# Patient Record
Sex: FEMALE | Race: BLACK OR AFRICAN AMERICAN | NOT HISPANIC OR LATINO | URBAN - METROPOLITAN AREA
[De-identification: names, ages, dates, MRNs, and addresses within clinical notes are randomized per-mention and may not be internally consistent; named-entity substitution may affect disease eponyms.]

---

## 2018-05-15 NOTE — PATIENT DISCUSSION
Artificial Tears: One drop to both eyes 3-4 times daily. We recommend Systane or Refresh lubricating eye drops which can be found at any pharmacy. Preservative free.

## 2020-08-08 NOTE — PATIENT DISCUSSION
+Lensar. I have personally seen and examined this patient.  I have fully participated in the care of this patient. I have reviewed all pertinent clinical information, including history, physical exam, plan and the Resident’s note and agree except as noted.

## 2021-02-25 ENCOUNTER — IMPORTED ENCOUNTER (OUTPATIENT)
Dept: URBAN - METROPOLITAN AREA CLINIC 9 | Facility: CLINIC | Age: 56
End: 2021-02-25

## 2021-02-25 PROBLEM — D44.3: Noted: 2021-02-25

## 2021-02-25 PROBLEM — H52.13: Noted: 2021-02-25

## 2021-02-25 PROBLEM — H25.13: Noted: 2021-02-25

## 2021-02-25 PROBLEM — H10.13: Noted: 2021-02-25

## 2021-02-25 PROBLEM — H50.34: Noted: 2021-02-25

## 2021-10-16 ASSESSMENT — TONOMETRY
OS_IOP_MMHG: 19
OD_IOP_MMHG: 18

## 2021-10-16 ASSESSMENT — VISUAL ACUITY
OD_CC: 20/20 SN
OS_CC: 20/20 SN
OS_CC: 20/20 - SN

## 2022-02-15 ENCOUNTER — ESTABLISHED PATIENT (OUTPATIENT)
Dept: URBAN - METROPOLITAN AREA CLINIC 14 | Facility: CLINIC | Age: 57
End: 2022-02-15

## 2022-02-15 DIAGNOSIS — D44.3: ICD-10-CM

## 2022-02-15 DIAGNOSIS — H25.13: ICD-10-CM

## 2022-02-15 DIAGNOSIS — H50.34: ICD-10-CM

## 2022-02-15 DIAGNOSIS — H52.13: ICD-10-CM

## 2022-02-15 DIAGNOSIS — H10.13: ICD-10-CM

## 2022-02-15 PROCEDURE — 99214 OFFICE O/P EST MOD 30 MIN: CPT

## 2022-02-15 PROCEDURE — 92015 DETERMINE REFRACTIVE STATE: CPT

## 2022-02-15 ASSESSMENT — TONOMETRY
OS_IOP_MMHG: 14
OD_IOP_MMHG: 13

## 2022-03-15 ENCOUNTER — TECH ONLY (OUTPATIENT)
Dept: URBAN - METROPOLITAN AREA CLINIC 14 | Facility: CLINIC | Age: 57
End: 2022-03-15

## 2022-03-15 DIAGNOSIS — H53.453: ICD-10-CM

## 2022-03-15 DIAGNOSIS — D44.3: ICD-10-CM

## 2022-03-15 PROCEDURE — 92083 EXTENDED VISUAL FIELD XM: CPT

## 2022-03-21 NOTE — PATIENT DISCUSSION
Discharge Planning:     Writer down to speak with patient about refusing therapy and rehab. Patient told writer she did not want to participate and she knows what is good for her. Writer provided education on importance of being up and working with therapy. Patient told writer she was not interested. Writer asked her if she would be agreeable to [facility] at discharge to assist with endurance and strengthening and she continued to refuse. Writer stated I can set it up and you can think about it, when you do discharge and they call, you can you make an informed decision at that point. Patient stated, \"oh I see, so you can cover yourself. \" Writer explained that is not the case, and if she did not want it, [facility] Junito will respect her wishes and end the conversation. Patient stated, \"thank you. \" Patient educated on [employer]fony blended vision. Patient understands that the near focal point will be at approximately 20 inches with the first eye. Near vision will be achieved with surgery on the second eye. Patient educated they may experience halos at night that are typically resolved without therapy. Patient educated there is a 1 in 500 chance there will be something about the vision that they do not like. Patient educated there is a 10% chance of needing enhancement after surgery. Patient elects Symfony OD, goal of emmetropia.

## 2022-07-04 RX ORDER — METOPROLOL SUCCINATE 50 MG/1
TABLET, EXTENDED RELEASE ORAL
COMMUNITY

## 2022-07-04 RX ORDER — FLUTICASONE PROPIONATE 50 MCG
SPRAY, SUSPENSION (ML) NASAL
COMMUNITY

## 2022-07-04 RX ORDER — AZELASTINE 1 MG/ML
SPRAY, METERED NASAL
COMMUNITY

## 2022-07-04 RX ORDER — OMEPRAZOLE 20 MG/1
CAPSULE, DELAYED RELEASE ORAL
COMMUNITY

## 2022-07-04 RX ORDER — TRIAMTERENE AND HYDROCHLOROTHIAZIDE 37.5; 25 MG/1; MG/1
TABLET ORAL
COMMUNITY

## 2022-07-15 PROBLEM — E23.7 ABNORMALITY OF PITUITARY GLAND (HCC): Status: ACTIVE | Noted: 2022-07-15

## 2022-07-15 PROBLEM — M75.42 SUBACROMIAL IMPINGEMENT OF LEFT SHOULDER: Status: ACTIVE | Noted: 2022-07-15

## 2022-07-15 PROBLEM — M62.838 TRAPEZIUS MUSCLE SPASM: Status: ACTIVE | Noted: 2022-07-15

## 2022-07-15 PROBLEM — M54.2 CERVICALGIA: Status: ACTIVE | Noted: 2022-07-15

## 2022-07-15 PROBLEM — M25.512 PAIN IN LEFT SHOULDER: Status: ACTIVE | Noted: 2022-07-15

## 2022-08-19 PROBLEM — E04.1 THYROID NODULE: Status: ACTIVE | Noted: 2022-08-19

## 2022-08-19 PROBLEM — R14.1 GAS PAIN: Status: ACTIVE | Noted: 2019-06-04

## 2022-08-19 PROBLEM — J06.9 ACUTE UPPER RESPIRATORY INFECTION, UNSPECIFIED: Status: ACTIVE | Noted: 2019-04-04

## 2022-08-19 PROBLEM — D56.0 ALPHA THALASSEMIA (HCC): Status: ACTIVE | Noted: 2019-03-19

## 2022-08-19 PROBLEM — R10.13 EPIGASTRIC PAIN: Status: ACTIVE | Noted: 2019-04-02

## 2022-08-19 PROBLEM — N39.0 URINARY TRACT INFECTION, SITE NOT SPECIFIED: Status: ACTIVE | Noted: 2019-03-19

## 2022-08-19 PROBLEM — B35.4 TINEA CORPORIS: Status: ACTIVE | Noted: 2019-06-04

## 2023-04-20 ENCOUNTER — TECH ONLY (OUTPATIENT)
Dept: URBAN - METROPOLITAN AREA CLINIC 14 | Facility: CLINIC | Age: 58
End: 2023-04-20

## 2023-04-20 DIAGNOSIS — H53.453: ICD-10-CM

## 2023-04-20 PROCEDURE — 92083 EXTENDED VISUAL FIELD XM: CPT

## 2024-07-02 ENCOUNTER — COMPREHENSIVE EXAM (OUTPATIENT)
Dept: URBAN - METROPOLITAN AREA CLINIC 16 | Facility: CLINIC | Age: 59
End: 2024-07-02

## 2024-07-02 DIAGNOSIS — D44.3: ICD-10-CM

## 2024-07-02 DIAGNOSIS — H52.13: ICD-10-CM

## 2024-07-02 DIAGNOSIS — Z01.00: ICD-10-CM

## 2024-07-02 DIAGNOSIS — H10.13: ICD-10-CM

## 2024-07-02 DIAGNOSIS — H50.34: ICD-10-CM

## 2024-07-02 DIAGNOSIS — H40.013: ICD-10-CM

## 2024-07-02 DIAGNOSIS — H53.453: ICD-10-CM

## 2024-07-02 DIAGNOSIS — H25.13: ICD-10-CM

## 2024-07-02 DIAGNOSIS — H53.2: ICD-10-CM

## 2024-07-02 PROCEDURE — 92014 COMPRE OPH EXAM EST PT 1/>: CPT

## 2024-07-02 PROCEDURE — 92015 DETERMINE REFRACTIVE STATE: CPT

## 2024-07-02 ASSESSMENT — TONOMETRY
OD_IOP_MMHG: 19
OS_IOP_MMHG: 18

## 2024-07-02 ASSESSMENT — KERATOMETRY
OS_AXISANGLE_DEGREES: 70
OS_AXISANGLE2_DEGREES: 160
OS_K1POWER_DIOPTERS: 40.75
OD_AXISANGLE2_DEGREES: 4
OD_K1POWER_DIOPTERS: 40.50
OD_K2POWER_DIOPTERS: 41.25
OS_K2POWER_DIOPTERS: 41.50
OD_AXISANGLE_DEGREES: 94

## 2024-07-02 ASSESSMENT — VISUAL ACUITY
OS_SC: 20/25
OD_SC: 20/60-2

## 2024-07-16 ENCOUNTER — DIAGNOSTICS ONLY (OUTPATIENT)
Dept: URBAN - METROPOLITAN AREA CLINIC 16 | Facility: CLINIC | Age: 59
End: 2024-07-16

## 2024-07-16 DIAGNOSIS — H40.013: ICD-10-CM

## 2024-07-16 PROCEDURE — 92083 EXTENDED VISUAL FIELD XM: CPT

## 2024-07-16 PROCEDURE — 92133 CPTRZD OPH DX IMG PST SGM ON: CPT

## 2024-07-16 ASSESSMENT — KERATOMETRY
OD_AXISANGLE2_DEGREES: 4
OS_AXISANGLE2_DEGREES: 160
OD_K2POWER_DIOPTERS: 41.25
OS_K1POWER_DIOPTERS: 40.75
OS_AXISANGLE_DEGREES: 70
OS_K2POWER_DIOPTERS: 41.50
OD_AXISANGLE_DEGREES: 94
OD_K1POWER_DIOPTERS: 40.50

## 2024-07-16 ASSESSMENT — TONOMETRY
OD_IOP_MMHG: 18
OS_IOP_MMHG: 22